# Patient Record
Sex: MALE
[De-identification: names, ages, dates, MRNs, and addresses within clinical notes are randomized per-mention and may not be internally consistent; named-entity substitution may affect disease eponyms.]

---

## 2017-01-23 ENCOUNTER — HOSPITAL (OUTPATIENT)
Dept: OTHER | Age: 52
End: 2017-01-23

## 2017-02-01 ENCOUNTER — HOSPITAL (OUTPATIENT)
Dept: OTHER | Age: 52
End: 2017-02-01

## 2020-09-15 PROBLEM — M46.1 SACROILIITIS, NOT ELSEWHERE CLASSIFIED (CMD): Status: ACTIVE | Noted: 2020-09-15

## 2020-09-15 PROBLEM — M54.50 LOW BACK PAIN: Status: ACTIVE | Noted: 2020-09-15

## 2023-10-25 ENCOUNTER — LAB OUTSIDE AN ENCOUNTER (OUTPATIENT)
Dept: URBAN - METROPOLITAN AREA SURGERY CENTER 12 | Facility: SURGERY CENTER | Age: 58
End: 2023-10-25

## 2023-10-30 ENCOUNTER — OUT OF OFFICE VISIT (OUTPATIENT)
Dept: URBAN - METROPOLITAN AREA SURGERY CENTER 12 | Facility: SURGERY CENTER | Age: 58
End: 2023-10-30
Payer: COMMERCIAL

## 2023-10-30 DIAGNOSIS — K64.8 OTHER HEMORRHOIDS: ICD-10-CM

## 2023-10-30 DIAGNOSIS — Z12.11 COLON CANCER SCREENING: ICD-10-CM

## 2023-10-30 DIAGNOSIS — Z12.11 ENCOUNTER FOR SCREENING FOR MALIGNANT NEOPLASM OF COLON: ICD-10-CM

## 2023-10-30 PROCEDURE — G0121 COLON CA SCRN NOT HI RSK IND: HCPCS | Performed by: INTERNAL MEDICINE

## 2023-10-30 PROCEDURE — 00812 ANES LWR INTST SCR COLSC: CPT | Performed by: NURSE ANESTHETIST, CERTIFIED REGISTERED

## 2023-11-13 ENCOUNTER — OFFICE VISIT (OUTPATIENT)
Dept: URBAN - METROPOLITAN AREA CLINIC 68 | Facility: CLINIC | Age: 58
End: 2023-11-13
Payer: COMMERCIAL

## 2023-11-13 ENCOUNTER — LAB OUTSIDE AN ENCOUNTER (OUTPATIENT)
Dept: URBAN - METROPOLITAN AREA CLINIC 68 | Facility: CLINIC | Age: 58
End: 2023-11-13

## 2023-11-13 VITALS
HEIGHT: 67 IN | WEIGHT: 130 LBS | HEART RATE: 65 BPM | OXYGEN SATURATION: 99 % | DIASTOLIC BLOOD PRESSURE: 80 MMHG | SYSTOLIC BLOOD PRESSURE: 124 MMHG | BODY MASS INDEX: 20.4 KG/M2

## 2023-11-13 DIAGNOSIS — K64.8 INTERNAL HEMORRHOIDS: ICD-10-CM

## 2023-11-13 DIAGNOSIS — R14.0 ABDOMINAL BLOATING: ICD-10-CM

## 2023-11-13 DIAGNOSIS — K59.00 CONSTIPATION, UNSPECIFIED CONSTIPATION TYPE: ICD-10-CM

## 2023-11-13 DIAGNOSIS — K76.0 FATTY LIVER: ICD-10-CM

## 2023-11-13 PROBLEM — 93871000119101: Status: ACTIVE | Noted: 2023-11-13

## 2023-11-13 PROBLEM — 15167005: Status: ACTIVE | Noted: 2023-11-13

## 2023-11-13 PROBLEM — 219006: Status: ACTIVE | Noted: 2023-11-13

## 2023-11-13 PROCEDURE — 99214 OFFICE O/P EST MOD 30 MIN: CPT

## 2023-11-13 NOTE — HPI-TODAY'S VISIT:
Patient presents for follow up s/p recent colonoscopy with Dr. Jackson (10/30/23). Refers he is doing well since DAC, but does admit to occassional constipation. Refers rachael post-prandial bloating as well. Refers recent abdominal US (at ) and imaging study at [facility]. Refers hx of hep C yrs ago (treated with Sovaldi). Refers hx ETOH and drug use in his 20s-30s with cessation of both now. Denies nausea, vomiting, dysphagia, odynophagia, heartburn, abdominal pain, diarrhea, hematochezia, melena, or unintentional weight loss.

## 2023-12-05 ENCOUNTER — CLAIMS CREATED FROM THE CLAIM WINDOW (OUTPATIENT)
Dept: URBAN - METROPOLITAN AREA CLINIC 67 | Facility: CLINIC | Age: 58
End: 2023-12-05

## 2023-12-05 ENCOUNTER — OFFICE VISIT (OUTPATIENT)
Dept: URBAN - METROPOLITAN AREA CLINIC 67 | Facility: CLINIC | Age: 58
End: 2023-12-05
Payer: COMMERCIAL

## 2023-12-05 DIAGNOSIS — K74.01 HEPATIC FIBROSIS, EARLY FIBROSIS: ICD-10-CM

## 2023-12-05 DIAGNOSIS — K76.0 FATTY (CHANGE OF) LIVER, NOT ELSEWHERE CLASSIFIED: ICD-10-CM

## 2023-12-05 PROCEDURE — 91200 LIVER ELASTOGRAPHY: CPT | Performed by: INTERNAL MEDICINE

## 2024-01-08 ENCOUNTER — LAB OUTSIDE AN ENCOUNTER (OUTPATIENT)
Dept: URBAN - METROPOLITAN AREA CLINIC 68 | Facility: CLINIC | Age: 59
End: 2024-01-08

## 2024-01-08 ENCOUNTER — OFFICE VISIT (OUTPATIENT)
Dept: URBAN - METROPOLITAN AREA CLINIC 68 | Facility: CLINIC | Age: 59
End: 2024-01-08
Payer: COMMERCIAL

## 2024-01-08 ENCOUNTER — DASHBOARD ENCOUNTERS (OUTPATIENT)
Age: 59
End: 2024-01-08

## 2024-01-08 VITALS
HEIGHT: 67 IN | DIASTOLIC BLOOD PRESSURE: 80 MMHG | HEART RATE: 66 BPM | WEIGHT: 140 LBS | SYSTOLIC BLOOD PRESSURE: 126 MMHG | BODY MASS INDEX: 21.97 KG/M2 | OXYGEN SATURATION: 98 % | TEMPERATURE: 97.8 F

## 2024-01-08 DIAGNOSIS — K76.0 FATTY LIVER: ICD-10-CM

## 2024-01-08 DIAGNOSIS — K59.00 CONSTIPATION, UNSPECIFIED CONSTIPATION TYPE: ICD-10-CM

## 2024-01-08 DIAGNOSIS — Z86.19 HISTORY OF HEPATITIS C: ICD-10-CM

## 2024-01-08 DIAGNOSIS — R14.0 ABDOMINAL BLOATING: ICD-10-CM

## 2024-01-08 PROBLEM — 197321007: Status: ACTIVE | Noted: 2023-11-13

## 2024-01-08 PROBLEM — 14760008: Status: ACTIVE | Noted: 2023-11-13

## 2024-01-08 PROBLEM — 116289008: Status: ACTIVE | Noted: 2023-11-13

## 2024-01-08 PROCEDURE — 99214 OFFICE O/P EST MOD 30 MIN: CPT

## 2024-01-08 NOTE — HPI-TODAY'S VISIT:
Patient presents for follow up s/p recent fibroscan. Refers hx of Hep C yrs ago (treated with Sovaldi). Refers he does continue with abdominal discomfort (despite use of IBguard), described as bloating of the lower abdomen.  Refers accompanying "bubbling" and indigestion of the lower abdomen post-prandially. Refers he did undergo recent negative colonoscopy and abdominal CT (under the direction of Dr. Simon) which was "fine". Refers hx ETOH and drug use in his 20s-30s with cessation of both now. Refers hx of occasional constipation, manged with high fiber diet, adequate water intake, and PRN miralax. Denies nausea, vomiting, dysphagia, odynophagia, heartburn, abdominal pain, diarrhea, hematochezia, melena, or unintentional weight loss.

## 2024-02-21 ENCOUNTER — ANC VISIT (OUTPATIENT)
Dept: URBAN - METROPOLITAN AREA CLINIC 67 | Facility: CLINIC | Age: 59
End: 2024-02-21
Payer: COMMERCIAL

## 2024-02-21 DIAGNOSIS — R14.0 ABDOMINAL BLOATING: ICD-10-CM

## 2024-02-21 PROCEDURE — 91065 BREATH HYDROGEN/METHANE TEST: CPT | Performed by: INTERNAL MEDICINE
